# Patient Record
Sex: FEMALE | Race: WHITE | NOT HISPANIC OR LATINO | Employment: OTHER | URBAN - METROPOLITAN AREA
[De-identification: names, ages, dates, MRNs, and addresses within clinical notes are randomized per-mention and may not be internally consistent; named-entity substitution may affect disease eponyms.]

---

## 2017-04-10 ENCOUNTER — HISTORICAL (OUTPATIENT)
Dept: ADMINISTRATIVE | Facility: HOSPITAL | Age: 82
End: 2017-04-10

## 2017-04-13 ENCOUNTER — HISTORICAL (OUTPATIENT)
Dept: ADMINISTRATIVE | Facility: HOSPITAL | Age: 82
End: 2017-04-13

## 2017-04-17 ENCOUNTER — HISTORICAL (OUTPATIENT)
Dept: ADMINISTRATIVE | Facility: HOSPITAL | Age: 82
End: 2017-04-17

## 2017-04-26 ENCOUNTER — HISTORICAL (OUTPATIENT)
Dept: ADMINISTRATIVE | Facility: HOSPITAL | Age: 82
End: 2017-04-26

## 2017-06-06 ENCOUNTER — HISTORICAL (OUTPATIENT)
Dept: ADMINISTRATIVE | Facility: HOSPITAL | Age: 82
End: 2017-06-06

## 2017-06-06 LAB
ABS NEUT (OLG): 3.29 X10(3)/MCL (ref 2.1–9.2)
ALBUMIN SERPL-MCNC: 3.5 GM/DL (ref 3.4–5)
ALBUMIN/GLOB SERPL: 1.1 {RATIO}
ALP SERPL-CCNC: 50 UNIT/L (ref 38–126)
ALT SERPL-CCNC: 25 UNIT/L (ref 12–78)
AST SERPL-CCNC: 14 UNIT/L (ref 15–37)
BASOPHILS # BLD AUTO: 0.2 X10(3)/MCL (ref 0–0.2)
BASOPHILS NFR BLD AUTO: 2 %
BILIRUB SERPL-MCNC: 0.6 MG/DL (ref 0.2–1)
BILIRUBIN DIRECT+TOT PNL SERPL-MCNC: 0.2 MG/DL (ref 0–0.2)
BILIRUBIN DIRECT+TOT PNL SERPL-MCNC: 0.4 MG/DL (ref 0–0.8)
BUN SERPL-MCNC: 19 MG/DL (ref 7–18)
CALCIUM SERPL-MCNC: 8.9 MG/DL (ref 8.5–10.1)
CHLORIDE SERPL-SCNC: 106 MMOL/L (ref 98–107)
CHOLEST SERPL-MCNC: 133 MG/DL (ref 0–200)
CHOLEST/HDLC SERPL: 3.2 {RATIO} (ref 0–4)
CO2 SERPL-SCNC: 28 MMOL/L (ref 21–32)
CREAT SERPL-MCNC: 0.77 MG/DL (ref 0.55–1.02)
EOSINOPHIL # BLD AUTO: 0.3 X10(3)/MCL (ref 0–0.9)
EOSINOPHIL NFR BLD AUTO: 5 %
ERYTHROCYTE [DISTWIDTH] IN BLOOD BY AUTOMATED COUNT: 13.7 % (ref 11.5–17)
GLOBULIN SER-MCNC: 3.3 GM/DL (ref 2.4–3.5)
GLUCOSE SERPL-MCNC: 87 MG/DL (ref 74–106)
HCT VFR BLD AUTO: 42.1 % (ref 37–47)
HDLC SERPL-MCNC: 42 MG/DL (ref 35–60)
HGB BLD-MCNC: 14.2 GM/DL (ref 12–16)
LDLC SERPL CALC-MCNC: 66 MG/DL (ref 0–129)
LYMPHOCYTES # BLD AUTO: 2.1 X10(3)/MCL (ref 0.6–4.6)
LYMPHOCYTES NFR BLD AUTO: 31 %
MCH RBC QN AUTO: 31.6 PG (ref 27–31)
MCHC RBC AUTO-ENTMCNC: 33.7 GM/DL (ref 33–36)
MCV RBC AUTO: 93.8 FL (ref 80–94)
MONOCYTES # BLD AUTO: 0.8 X10(3)/MCL (ref 0.1–1.3)
MONOCYTES NFR BLD AUTO: 12 %
NEUTROPHILS # BLD AUTO: 3.29 X10(3)/MCL (ref 2.1–9.2)
NEUTROPHILS NFR BLD AUTO: 49 %
PLATELET # BLD AUTO: 267 X10(3)/MCL (ref 130–400)
PMV BLD AUTO: 10 FL (ref 9.4–12.4)
POTASSIUM SERPL-SCNC: 4.4 MMOL/L (ref 3.5–5.1)
PROT SERPL-MCNC: 6.8 GM/DL (ref 6.4–8.2)
RBC # BLD AUTO: 4.49 X10(6)/MCL (ref 4.2–5.4)
SODIUM SERPL-SCNC: 140 MMOL/L (ref 136–145)
TRIGL SERPL-MCNC: 125 MG/DL (ref 30–150)
VLDLC SERPL CALC-MCNC: 25 MG/DL
WBC # SPEC AUTO: 6.7 X10(3)/MCL (ref 4.5–11.5)

## 2017-06-22 ENCOUNTER — HISTORICAL (OUTPATIENT)
Dept: LAB | Facility: HOSPITAL | Age: 82
End: 2017-06-22

## 2017-06-22 LAB
ABS NEUT (OLG): 4.6 X10(3)/MCL (ref 2.1–9.2)
ALBUMIN SERPL-MCNC: 3.7 GM/DL (ref 3.4–5)
ALBUMIN/GLOB SERPL: 1.1 RATIO (ref 1.1–2)
ALP SERPL-CCNC: 53 UNIT/L (ref 38–126)
ALT SERPL-CCNC: 27 UNIT/L (ref 12–78)
APPEARANCE, UA: CLEAR
APTT PPP: 26.9 SECOND(S) (ref 20.6–36)
AST SERPL-CCNC: 15 UNIT/L (ref 15–37)
BACTERIA SPEC CULT: ABNORMAL /HPF
BASOPHILS # BLD AUTO: 0.2 X10(3)/MCL (ref 0–0.2)
BASOPHILS NFR BLD AUTO: 2 %
BILIRUB SERPL-MCNC: 0.7 MG/DL (ref 0.2–1)
BILIRUB UR QL STRIP: NEGATIVE
BILIRUBIN DIRECT+TOT PNL SERPL-MCNC: 0 MG/DL (ref 0–0.8)
BILIRUBIN DIRECT+TOT PNL SERPL-MCNC: 0.7 MG/DL (ref 0–0.5)
BUN SERPL-MCNC: 15 MG/DL (ref 7–18)
CALCIUM SERPL-MCNC: 9 MG/DL (ref 8.5–10.1)
CHLORIDE SERPL-SCNC: 105 MMOL/L (ref 98–107)
CO2 SERPL-SCNC: 25 MMOL/L (ref 21–32)
COLOR UR: YELLOW
CREAT SERPL-MCNC: 0.8 MG/DL (ref 0.55–1.02)
EOSINOPHIL # BLD AUTO: 0.2 X10(3)/MCL (ref 0–0.9)
EOSINOPHIL NFR BLD AUTO: 3 %
ERYTHROCYTE [DISTWIDTH] IN BLOOD BY AUTOMATED COUNT: 13.5 % (ref 11.5–17)
GLOBULIN SER-MCNC: 3.4 GM/DL (ref 2.4–3.5)
GLUCOSE (UA): NEGATIVE
GLUCOSE SERPL-MCNC: 92 MG/DL (ref 74–106)
HCT VFR BLD AUTO: 42.7 % (ref 37–47)
HGB BLD-MCNC: 14.4 GM/DL (ref 12–16)
HGB UR QL STRIP: ABNORMAL
INR PPP: 1.09 (ref 0–1.27)
KETONES UR QL STRIP: NEGATIVE
LEUKOCYTE ESTERASE UR QL STRIP: ABNORMAL
LYMPHOCYTES # BLD AUTO: 1.8 X10(3)/MCL (ref 0.6–4.6)
LYMPHOCYTES NFR BLD AUTO: 23 %
MCH RBC QN AUTO: 31.4 PG (ref 27–31)
MCHC RBC AUTO-ENTMCNC: 33.7 GM/DL (ref 33–36)
MCV RBC AUTO: 93.2 FL (ref 80–94)
MONOCYTES # BLD AUTO: 0.8 X10(3)/MCL (ref 0.1–1.3)
MONOCYTES NFR BLD AUTO: 10 %
NEUTROPHILS # BLD AUTO: 4.6 X10(3)/MCL (ref 2.1–9.2)
NEUTROPHILS NFR BLD AUTO: 61 %
NITRITE UR QL STRIP: NEGATIVE
PH UR STRIP: 5 [PH] (ref 5–9)
PLATELET # BLD AUTO: 291 X10(3)/MCL (ref 130–400)
PMV BLD AUTO: 9.9 FL (ref 9.4–12.4)
POTASSIUM SERPL-SCNC: 4.3 MMOL/L (ref 3.5–5.1)
PROT SERPL-MCNC: 7.1 GM/DL (ref 6.4–8.2)
PROT UR QL STRIP: NEGATIVE
PROTHROMBIN TIME: 13.9 SECOND(S) (ref 12.1–14.2)
RBC # BLD AUTO: 4.58 X10(6)/MCL (ref 4.2–5.4)
RBC #/AREA URNS HPF: ABNORMAL /[HPF]
SODIUM SERPL-SCNC: 137 MMOL/L (ref 136–145)
SP GR UR STRIP: 1.02 (ref 1–1.03)
SQUAMOUS EPITHELIAL, UA: ABNORMAL
TRANSFERRIN SERPL-MCNC: 217 MG/DL (ref 200–360)
UROBILINOGEN UR STRIP-ACNC: 0.2
WBC # SPEC AUTO: 7.5 X10(3)/MCL (ref 4.5–11.5)
WBC #/AREA URNS HPF: 15 /HPF (ref 0–3)

## 2017-06-24 LAB — FINAL CULTURE: NO GROWTH

## 2017-07-13 ENCOUNTER — HISTORICAL (OUTPATIENT)
Dept: ADMINISTRATIVE | Facility: HOSPITAL | Age: 82
End: 2017-07-13

## 2017-07-13 LAB
ABS NEUT (OLG): 6.1 X10(3)/MCL
ALBUMIN SERPL-MCNC: 2.5 GM/DL (ref 3.4–5)
ALBUMIN/GLOB SERPL: 1 {RATIO}
ALP SERPL-CCNC: 45 UNIT/L (ref 45–117)
ALT SERPL-CCNC: 19 UNIT/L (ref 13–56)
AST SERPL-CCNC: 25 UNIT/L (ref 15–37)
BASOPHILS # BLD AUTO: 0.1 X10(3)/MCL
BASOPHILS NFR BLD AUTO: 0.7 % (ref 0–1)
BILIRUB SERPL-MCNC: 0.5 MG/DL (ref 0.2–1)
BILIRUBIN DIRECT+TOT PNL SERPL-MCNC: 0.1 MG/DL (ref 0–0.2)
BILIRUBIN DIRECT+TOT PNL SERPL-MCNC: 0.4 MG/DL (ref 0–1)
BUN SERPL-MCNC: 19 MG/DL (ref 7–18)
CALCIUM SERPL-MCNC: 8.2 MG/DL (ref 8.5–10.1)
CHLORIDE SERPL-SCNC: 109 MMOL/L (ref 98–107)
CHOLEST SERPL-MCNC: 102 MG/DL (ref 0–200)
CHOLEST/HDLC SERPL: 2.2 {RATIO} (ref 0–4)
CO2 SERPL-SCNC: 27 MMOL/L (ref 21–32)
CREAT SERPL-MCNC: 0.86 MG/DL (ref 0.55–1.02)
EOSINOPHIL # BLD AUTO: 0.2 X10(3)/MCL
EOSINOPHIL NFR BLD AUTO: 2.8 % (ref 0–6.4)
ERYTHROCYTE [DISTWIDTH] IN BLOOD BY AUTOMATED COUNT: 14.1 % (ref 11.5–14.8)
GLOBULIN SER-MCNC: 4 GM/DL (ref 2–4)
GLUCOSE SERPL-MCNC: 95 MG/DL (ref 74–106)
HCT VFR BLD AUTO: 32.7 % (ref 35.5–44.6)
HDLC SERPL-MCNC: 47 MG/DL (ref 35–60)
HGB BLD-MCNC: 11.1 GM/DL (ref 12.1–15.4)
LDLC SERPL CALC-MCNC: 37 MG/DL (ref 0–129)
LYMPHOCYTES # BLD AUTO: 1.4 X10(3)/MCL
LYMPHOCYTES NFR BLD AUTO: 16.2 % (ref 16–44)
MCH RBC QN AUTO: 32.3 PG (ref 28.5–33.8)
MCHC RBC AUTO-ENTMCNC: 33.9 % (ref 33–37)
MCV RBC AUTO: 95.1 FL (ref 82–99)
MONOCYTES # BLD AUTO: 1 X10(3)/MCL
MONOCYTES NFR BLD AUTO: 11.2 % (ref 4–12.1)
NEUTROPHILS # BLD AUTO: 6.1 X10(3)/MCL
NEUTROPHILS NFR BLD AUTO: 68.9 % (ref 43–73)
PLATELET # BLD AUTO: 247 X10(3)/MCL (ref 136–369)
PMV BLD AUTO: 10.4 FL (ref 7.4–10.4)
POTASSIUM SERPL-SCNC: 5.2 MMOL/L (ref 3.5–5.1)
PREALB SERPL-MCNC: 11.4 MG/DL (ref 20–40)
PROT SERPL-MCNC: 6.4 GM/DL (ref 6.4–8.2)
RBC # BLD AUTO: 3.44 X10(6)/MCL (ref 4–5.5)
SODIUM SERPL-SCNC: 140 MMOL/L (ref 136–145)
TRIGL SERPL-MCNC: 89 MG/DL (ref 30–150)
VLDLC SERPL CALC-MCNC: 18 MG/DL
WBC # SPEC AUTO: 8.8 X10(3)/MCL (ref 4–10)

## 2017-07-14 LAB
ALBUMIN SERPL-MCNC: 2.5 GM/DL (ref 3.4–5)
ALBUMIN/GLOB SERPL: 0.7 {RATIO}
ALP SERPL-CCNC: 46 UNIT/L (ref 38–126)
ALT SERPL-CCNC: 22 UNIT/L (ref 12–78)
AST SERPL-CCNC: 27 UNIT/L (ref 15–37)
BILIRUB SERPL-MCNC: 0.6 MG/DL (ref 0.2–1)
BILIRUBIN DIRECT+TOT PNL SERPL-MCNC: 0.2 MG/DL (ref 0–0.2)
BILIRUBIN DIRECT+TOT PNL SERPL-MCNC: 0.4 MG/DL (ref 0–0.8)
BUN SERPL-MCNC: 14 MG/DL (ref 7–18)
CALCIUM SERPL-MCNC: 8.1 MG/DL (ref 8.5–10.1)
CHLORIDE SERPL-SCNC: 108 MMOL/L (ref 98–107)
CO2 SERPL-SCNC: 26 MMOL/L (ref 21–32)
CREAT SERPL-MCNC: 0.6 MG/DL (ref 0.55–1.02)
GLOBULIN SER-MCNC: 3.7 GM/DL (ref 2.4–3.5)
GLUCOSE SERPL-MCNC: 89 MG/DL (ref 74–106)
POTASSIUM SERPL-SCNC: 4.3 MMOL/L (ref 3.5–5.1)
PROT SERPL-MCNC: 6.2 GM/DL (ref 6.4–8.2)
SODIUM SERPL-SCNC: 140 MMOL/L (ref 136–145)

## 2017-07-17 LAB
ABS NEUT (OLG): 4.43 X10(3)/MCL (ref 2.1–9.2)
ALBUMIN SERPL-MCNC: 2.4 GM/DL (ref 3.4–5)
BASOPHILS # BLD AUTO: 0.2 X10(3)/MCL (ref 0–0.2)
BASOPHILS NFR BLD AUTO: 2 %
BUN SERPL-MCNC: 9 MG/DL (ref 7–18)
CALCIUM SERPL-MCNC: 8.2 MG/DL (ref 8.5–10.1)
CHLORIDE SERPL-SCNC: 106 MMOL/L (ref 98–107)
CO2 SERPL-SCNC: 26 MMOL/L (ref 21–32)
CREAT SERPL-MCNC: 0.63 MG/DL (ref 0.55–1.02)
EOSINOPHIL # BLD AUTO: 0.4 X10(3)/MCL (ref 0–0.9)
EOSINOPHIL NFR BLD AUTO: 5 %
ERYTHROCYTE [DISTWIDTH] IN BLOOD BY AUTOMATED COUNT: 13.7 % (ref 11.5–17)
GLUCOSE SERPL-MCNC: 89 MG/DL (ref 74–106)
HCT VFR BLD AUTO: 27.4 % (ref 37–47)
HGB BLD-MCNC: 9.2 GM/DL (ref 12–16)
LYMPHOCYTES # BLD AUTO: 2.1 X10(3)/MCL (ref 0.6–4.6)
LYMPHOCYTES NFR BLD AUTO: 25 %
MCH RBC QN AUTO: 31.7 PG (ref 27–31)
MCHC RBC AUTO-ENTMCNC: 33.6 GM/DL (ref 33–36)
MCV RBC AUTO: 94.5 FL (ref 80–94)
MONOCYTES # BLD AUTO: 1 X10(3)/MCL (ref 0.1–1.3)
MONOCYTES NFR BLD AUTO: 13 %
NEUTROPHILS # BLD AUTO: 4.43 X10(3)/MCL (ref 2.1–9.2)
NEUTROPHILS NFR BLD AUTO: 54 %
PLATELET # BLD AUTO: 298 X10(3)/MCL (ref 130–400)
PMV BLD AUTO: 9.9 FL (ref 9.4–12.4)
POTASSIUM SERPL-SCNC: 4.2 MMOL/L (ref 3.5–5.1)
PROT SERPL-MCNC: 5.8 GM/DL (ref 6.4–8.2)
RBC # BLD AUTO: 2.9 X10(6)/MCL (ref 4.2–5.4)
SODIUM SERPL-SCNC: 141 MMOL/L (ref 136–145)
WBC # SPEC AUTO: 8.3 X10(3)/MCL (ref 4.5–11.5)

## 2017-07-19 LAB
HCT VFR BLD AUTO: 29.3 % (ref 35.5–44.6)
HGB BLD-MCNC: 9.9 GM/DL (ref 12.1–15.4)

## 2017-07-20 LAB
ERYTHROCYTE [DISTWIDTH] IN BLOOD BY AUTOMATED COUNT: 14.1 % (ref 11.5–17)
HCT VFR BLD AUTO: 28.5 % (ref 37–47)
HGB BLD-MCNC: 9.5 GM/DL (ref 12–16)
MCH RBC QN AUTO: 31.7 PG (ref 27–31)
MCHC RBC AUTO-ENTMCNC: 33.3 GM/DL (ref 33–36)
MCV RBC AUTO: 95 FL (ref 80–94)
PLATELET # BLD AUTO: 374 X10(3)/MCL (ref 130–400)
PMV BLD AUTO: 9.6 FL (ref 9.4–12.4)
RBC # BLD AUTO: 3 X10(6)/MCL (ref 4.2–5.4)
WBC # SPEC AUTO: 10.1 X10(3)/MCL (ref 4.5–11.5)

## 2017-07-24 LAB
ABS NEUT (OLG): 5.53 X10(3)/MCL (ref 2.1–9.2)
ALBUMIN SERPL-MCNC: 2.6 GM/DL (ref 3.4–5)
BASOPHILS # BLD AUTO: 0.1 X10(3)/MCL (ref 0–0.2)
BASOPHILS NFR BLD AUTO: 2 %
BUN SERPL-MCNC: 10 MG/DL (ref 7–18)
CALCIUM SERPL-MCNC: 8.5 MG/DL (ref 8.5–10.1)
CHLORIDE SERPL-SCNC: 102 MMOL/L (ref 98–107)
CO2 SERPL-SCNC: 27 MMOL/L (ref 21–32)
CREAT SERPL-MCNC: 0.74 MG/DL (ref 0.55–1.02)
EOSINOPHIL # BLD AUTO: 0.4 X10(3)/MCL (ref 0–0.9)
EOSINOPHIL NFR BLD AUTO: 5 %
ERYTHROCYTE [DISTWIDTH] IN BLOOD BY AUTOMATED COUNT: 14.7 % (ref 11.5–17)
GLUCOSE SERPL-MCNC: 85 MG/DL (ref 74–106)
HCT VFR BLD AUTO: 28.6 % (ref 37–47)
HGB BLD-MCNC: 9.4 GM/DL (ref 12–16)
LYMPHOCYTES # BLD AUTO: 2.1 X10(3)/MCL (ref 0.6–4.6)
LYMPHOCYTES NFR BLD AUTO: 23 %
MCH RBC QN AUTO: 31.9 PG (ref 27–31)
MCHC RBC AUTO-ENTMCNC: 32.9 GM/DL (ref 33–36)
MCV RBC AUTO: 96.9 FL (ref 80–94)
MONOCYTES # BLD AUTO: 0.9 X10(3)/MCL (ref 0.1–1.3)
MONOCYTES NFR BLD AUTO: 10 %
NEUTROPHILS # BLD AUTO: 5.53 X10(3)/MCL (ref 2.1–9.2)
NEUTROPHILS NFR BLD AUTO: 60 %
PLATELET # BLD AUTO: 408 X10(3)/MCL (ref 130–400)
PMV BLD AUTO: 9.4 FL (ref 9.4–12.4)
POTASSIUM SERPL-SCNC: 4.4 MMOL/L (ref 3.5–5.1)
PROT SERPL-MCNC: 6.1 GM/DL (ref 6.4–8.2)
RBC # BLD AUTO: 2.95 X10(6)/MCL (ref 4.2–5.4)
SODIUM SERPL-SCNC: 136 MMOL/L (ref 136–145)
WBC # SPEC AUTO: 9.2 X10(3)/MCL (ref 4.5–11.5)

## 2017-08-01 ENCOUNTER — HISTORICAL (OUTPATIENT)
Dept: ADMINISTRATIVE | Facility: HOSPITAL | Age: 82
End: 2017-08-01

## 2017-08-02 LAB
BUN SERPL-MCNC: 11 MG/DL (ref 7–18)
CALCIUM SERPL-MCNC: 8.7 MG/DL (ref 9–10.9)
CHLORIDE SERPL-SCNC: 100 MMOL/L (ref 98–116)
CO2 SERPL-SCNC: 25 MMOL/L (ref 15–28)
CREAT SERPL-MCNC: 0.71 MG/DL (ref 0.6–1.3)
GLUCOSE SERPL-MCNC: 91 MG/DL (ref 74–106)
POTASSIUM SERPL-SCNC: 4.4 MMOL/L (ref 3.5–5.1)
SODIUM SERPL-SCNC: 132 MEQ/L (ref 131–145)

## 2017-12-11 ENCOUNTER — HISTORICAL (OUTPATIENT)
Dept: ADMINISTRATIVE | Facility: HOSPITAL | Age: 82
End: 2017-12-11

## 2017-12-11 LAB
ABS NEUT (OLG): 3.58 X10(3)/MCL (ref 2.1–9.2)
ALBUMIN SERPL-MCNC: 3.3 GM/DL (ref 3.4–5)
ALBUMIN/GLOB SERPL: 0.9 {RATIO}
ALP SERPL-CCNC: 55 UNIT/L (ref 38–126)
ALT SERPL-CCNC: 25 UNIT/L (ref 12–78)
AST SERPL-CCNC: 17 UNIT/L (ref 15–37)
BASOPHILS # BLD AUTO: 0.1 X10(3)/MCL (ref 0–0.2)
BASOPHILS NFR BLD AUTO: 2 %
BILIRUB SERPL-MCNC: 0.6 MG/DL (ref 0.2–1)
BILIRUBIN DIRECT+TOT PNL SERPL-MCNC: 0.2 MG/DL (ref 0–0.2)
BILIRUBIN DIRECT+TOT PNL SERPL-MCNC: 0.4 MG/DL (ref 0–0.8)
BUN SERPL-MCNC: 13 MG/DL (ref 7–18)
CALCIUM SERPL-MCNC: 8.6 MG/DL (ref 8.5–10.1)
CHLORIDE SERPL-SCNC: 106 MMOL/L (ref 98–107)
CHOLEST SERPL-MCNC: 125 MG/DL (ref 0–200)
CHOLEST/HDLC SERPL: 3.3 {RATIO} (ref 0–4)
CO2 SERPL-SCNC: 26 MMOL/L (ref 21–32)
CREAT SERPL-MCNC: 0.66 MG/DL (ref 0.55–1.02)
EOSINOPHIL # BLD AUTO: 0.4 X10(3)/MCL (ref 0–0.9)
EOSINOPHIL NFR BLD AUTO: 6 %
ERYTHROCYTE [DISTWIDTH] IN BLOOD BY AUTOMATED COUNT: 14.2 % (ref 11.5–17)
GLOBULIN SER-MCNC: 3.5 GM/DL (ref 2.4–3.5)
GLUCOSE SERPL-MCNC: 81 MG/DL (ref 74–106)
HCT VFR BLD AUTO: 42.3 % (ref 37–47)
HDLC SERPL-MCNC: 38 MG/DL (ref 35–60)
HGB BLD-MCNC: 13.8 GM/DL (ref 12–16)
LDLC SERPL CALC-MCNC: 60 MG/DL (ref 0–129)
LYMPHOCYTES # BLD AUTO: 1.6 X10(3)/MCL (ref 0.6–4.6)
LYMPHOCYTES NFR BLD AUTO: 25 %
MCH RBC QN AUTO: 30.1 PG (ref 27–31)
MCHC RBC AUTO-ENTMCNC: 32.6 GM/DL (ref 33–36)
MCV RBC AUTO: 92.2 FL (ref 80–94)
MONOCYTES # BLD AUTO: 0.6 X10(3)/MCL (ref 0.1–1.3)
MONOCYTES NFR BLD AUTO: 10 %
NEUTROPHILS # BLD AUTO: 3.58 X10(3)/MCL (ref 1.4–7.9)
NEUTROPHILS NFR BLD AUTO: 57 %
PLATELET # BLD AUTO: 253 X10(3)/MCL (ref 130–400)
PMV BLD AUTO: 10.1 FL (ref 9.4–12.4)
POTASSIUM SERPL-SCNC: 4.5 MMOL/L (ref 3.5–5.1)
PROT SERPL-MCNC: 6.8 GM/DL (ref 6.4–8.2)
RBC # BLD AUTO: 4.59 X10(6)/MCL (ref 4.2–5.4)
SODIUM SERPL-SCNC: 140 MMOL/L (ref 136–145)
TRIGL SERPL-MCNC: 134 MG/DL (ref 30–150)
TSH SERPL-ACNC: 1.12 MIU/ML (ref 0.36–3.74)
VLDLC SERPL CALC-MCNC: 27 MG/DL
WBC # SPEC AUTO: 6.2 X10(3)/MCL (ref 4.5–11.5)

## 2018-03-15 ENCOUNTER — HISTORICAL (OUTPATIENT)
Dept: ADMINISTRATIVE | Facility: HOSPITAL | Age: 83
End: 2018-03-15

## 2018-06-12 ENCOUNTER — HISTORICAL (OUTPATIENT)
Dept: ADMINISTRATIVE | Facility: HOSPITAL | Age: 83
End: 2018-06-12

## 2018-06-12 LAB
ABS NEUT (OLG): 3.06 X10(3)/MCL (ref 2.1–9.2)
BASOPHILS # BLD AUTO: 0.2 X10(3)/MCL (ref 0–0.2)
BASOPHILS NFR BLD AUTO: 2 %
CHOLEST SERPL-MCNC: 128 MG/DL (ref 0–200)
CHOLEST/HDLC SERPL: 4.3 {RATIO} (ref 0–4)
EOSINOPHIL # BLD AUTO: 0.4 X10(3)/MCL (ref 0–0.9)
EOSINOPHIL NFR BLD AUTO: 6 %
ERYTHROCYTE [DISTWIDTH] IN BLOOD BY AUTOMATED COUNT: 14.9 % (ref 11.5–17)
HCT VFR BLD AUTO: 37.4 % (ref 37–47)
HDLC SERPL-MCNC: 30 MG/DL (ref 35–60)
HGB BLD-MCNC: 11.8 GM/DL (ref 12–16)
LDLC SERPL CALC-MCNC: 42 MG/DL (ref 0–129)
LYMPHOCYTES # BLD AUTO: 2.1 X10(3)/MCL (ref 0.6–4.6)
LYMPHOCYTES NFR BLD AUTO: 33 %
MCH RBC QN AUTO: 32.2 PG (ref 27–31)
MCHC RBC AUTO-ENTMCNC: 31.6 GM/DL (ref 33–36)
MCV RBC AUTO: 102.2 FL (ref 80–94)
MONOCYTES # BLD AUTO: 0.7 X10(3)/MCL (ref 0.1–1.3)
MONOCYTES NFR BLD AUTO: 10 %
NEUTROPHILS # BLD AUTO: 3.06 X10(3)/MCL (ref 2.1–9.2)
NEUTROPHILS NFR BLD AUTO: 48 %
PLATELET # BLD AUTO: 321 X10(3)/MCL (ref 130–400)
PMV BLD AUTO: 9.4 FL (ref 9.4–12.4)
RBC # BLD AUTO: 3.66 X10(6)/MCL (ref 4.2–5.4)
TRIGL SERPL-MCNC: 281 MG/DL (ref 30–150)
TSH SERPL-ACNC: 1.22 MIU/L (ref 0.36–3.74)
VLDLC SERPL CALC-MCNC: 56 MG/DL
WBC # SPEC AUTO: 6.4 X10(3)/MCL (ref 4.5–11.5)

## 2018-11-30 ENCOUNTER — HISTORICAL (OUTPATIENT)
Dept: ADMINISTRATIVE | Facility: HOSPITAL | Age: 83
End: 2018-11-30

## 2018-11-30 LAB
ABS NEUT (OLG): 2.19 X10(3)/MCL (ref 2.1–9.2)
ALBUMIN SERPL-MCNC: 3.4 GM/DL (ref 3.4–5)
ALBUMIN/GLOB SERPL: 1 {RATIO}
ALP SERPL-CCNC: 47 UNIT/L (ref 38–126)
ALT SERPL-CCNC: 25 UNIT/L (ref 12–78)
AST SERPL-CCNC: 16 UNIT/L (ref 15–37)
BILIRUB SERPL-MCNC: 0.6 MG/DL (ref 0.2–1)
BILIRUBIN DIRECT+TOT PNL SERPL-MCNC: 0.1 MG/DL (ref 0–0.2)
BILIRUBIN DIRECT+TOT PNL SERPL-MCNC: 0.5 MG/DL (ref 0–0.8)
BUN SERPL-MCNC: 11 MG/DL (ref 7–18)
CALCIUM SERPL-MCNC: 8.8 MG/DL (ref 8.5–10.1)
CHLORIDE SERPL-SCNC: 104 MMOL/L (ref 98–107)
CHOLEST SERPL-MCNC: 121 MG/DL (ref 0–200)
CHOLEST/HDLC SERPL: 3.4 {RATIO} (ref 0–4)
CO2 SERPL-SCNC: 26 MMOL/L (ref 21–32)
CREAT SERPL-MCNC: 0.78 MG/DL (ref 0.55–1.02)
EOSINOPHIL NFR BLD MANUAL: 3 % (ref 0–8)
ERYTHROCYTE [DISTWIDTH] IN BLOOD BY AUTOMATED COUNT: 13.6 % (ref 11.5–17)
GLOBULIN SER-MCNC: 3.5 GM/DL (ref 2.4–3.5)
GLUCOSE SERPL-MCNC: 93 MG/DL (ref 74–106)
HCT VFR BLD AUTO: 41.2 % (ref 37–47)
HDLC SERPL-MCNC: 36 MG/DL (ref 35–60)
HGB BLD-MCNC: 13.5 GM/DL (ref 12–16)
LDLC SERPL CALC-MCNC: 44 MG/DL (ref 0–129)
LYMPHOCYTES NFR BLD MANUAL: 27 % (ref 13–40)
MCH RBC QN AUTO: 31.9 PG (ref 27–31)
MCHC RBC AUTO-ENTMCNC: 32.8 GM/DL (ref 33–36)
MCV RBC AUTO: 97.4 FL (ref 80–94)
MONOCYTES NFR BLD MANUAL: 15 % (ref 2–11)
NEUTROPHILS NFR BLD MANUAL: 55 % (ref 47–80)
PLATELET # BLD AUTO: 263 X10(3)/MCL (ref 130–400)
PLATELET # BLD EST: NORMAL 10*3/UL
PMV BLD AUTO: 9.5 FL (ref 7.4–10.4)
POTASSIUM SERPL-SCNC: 4.6 MMOL/L (ref 3.5–5.1)
PROT SERPL-MCNC: 6.9 GM/DL (ref 6.4–8.2)
RBC # BLD AUTO: 4.23 X10(6)/MCL (ref 4.2–5.4)
SODIUM SERPL-SCNC: 139 MMOL/L (ref 136–145)
TRIGL SERPL-MCNC: 205 MG/DL (ref 30–150)
TSH SERPL-ACNC: 1.03 MIU/L (ref 0.36–3.74)
VLDLC SERPL CALC-MCNC: 41 MG/DL
WBC # SPEC AUTO: 4.9 X10(3)/MCL (ref 4.5–11.5)

## 2019-05-28 ENCOUNTER — HISTORICAL (OUTPATIENT)
Dept: ADMINISTRATIVE | Facility: HOSPITAL | Age: 84
End: 2019-05-28

## 2019-05-31 ENCOUNTER — HISTORICAL (OUTPATIENT)
Dept: ADMINISTRATIVE | Facility: HOSPITAL | Age: 84
End: 2019-05-31

## 2019-05-31 LAB
ABS NEUT (OLG): 2.65 X10(3)/MCL (ref 2.1–9.2)
ALBUMIN SERPL-MCNC: 3.6 GM/DL (ref 3.4–5)
ALBUMIN/GLOB SERPL: 1 {RATIO}
ALP SERPL-CCNC: 52 UNIT/L (ref 38–126)
ALT SERPL-CCNC: 23 UNIT/L (ref 12–78)
AST SERPL-CCNC: 15 UNIT/L (ref 15–37)
BASOPHILS # BLD AUTO: 0.1 X10(3)/MCL (ref 0–0.2)
BASOPHILS NFR BLD AUTO: 2 %
BILIRUB SERPL-MCNC: 0.5 MG/DL (ref 0.2–1)
BILIRUBIN DIRECT+TOT PNL SERPL-MCNC: 0.1 MG/DL (ref 0–0.2)
BILIRUBIN DIRECT+TOT PNL SERPL-MCNC: 0.4 MG/DL (ref 0–0.8)
BUN SERPL-MCNC: 10 MG/DL (ref 7–18)
CALCIUM SERPL-MCNC: 9.3 MG/DL (ref 8.5–10.1)
CHLORIDE SERPL-SCNC: 106 MMOL/L (ref 98–107)
CHOLEST SERPL-MCNC: 139 MG/DL (ref 0–200)
CHOLEST/HDLC SERPL: 3.9 {RATIO} (ref 0–4)
CO2 SERPL-SCNC: 26 MMOL/L (ref 21–32)
CREAT SERPL-MCNC: 0.8 MG/DL (ref 0.55–1.02)
EOSINOPHIL # BLD AUTO: 0.2 X10(3)/MCL (ref 0–0.9)
EOSINOPHIL NFR BLD AUTO: 4 %
ERYTHROCYTE [DISTWIDTH] IN BLOOD BY AUTOMATED COUNT: 13.1 % (ref 11.5–17)
GLOBULIN SER-MCNC: 3.6 GM/DL (ref 2.4–3.5)
GLUCOSE SERPL-MCNC: 99 MG/DL (ref 74–106)
HCT VFR BLD AUTO: 40.2 % (ref 37–47)
HDLC SERPL-MCNC: 36 MG/DL (ref 35–60)
HGB BLD-MCNC: 13.2 GM/DL (ref 12–16)
LDLC SERPL CALC-MCNC: 45 MG/DL (ref 0–129)
LYMPHOCYTES # BLD AUTO: 1.8 X10(3)/MCL (ref 0.6–4.6)
LYMPHOCYTES NFR BLD AUTO: 34 %
MCH RBC QN AUTO: 32 PG (ref 27–31)
MCHC RBC AUTO-ENTMCNC: 32.8 GM/DL (ref 33–36)
MCV RBC AUTO: 97.6 FL (ref 80–94)
MONOCYTES # BLD AUTO: 0.6 X10(3)/MCL (ref 0.1–1.3)
MONOCYTES NFR BLD AUTO: 11 %
NEUTROPHILS # BLD AUTO: 2.65 X10(3)/MCL (ref 2.1–9.2)
NEUTROPHILS NFR BLD AUTO: 48 %
PLATELET # BLD AUTO: 257 X10(3)/MCL (ref 130–400)
PMV BLD AUTO: 9.8 FL (ref 9.4–12.4)
POTASSIUM SERPL-SCNC: 4.3 MMOL/L (ref 3.5–5.1)
PROT SERPL-MCNC: 7.2 GM/DL (ref 6.4–8.2)
RBC # BLD AUTO: 4.12 X10(6)/MCL (ref 4.2–5.4)
SODIUM SERPL-SCNC: 139 MMOL/L (ref 136–145)
TRIGL SERPL-MCNC: 292 MG/DL (ref 30–150)
TSH SERPL-ACNC: 1.84 MIU/L (ref 0.36–3.74)
VLDLC SERPL CALC-MCNC: 58 MG/DL
WBC # SPEC AUTO: 5.5 X10(3)/MCL (ref 4.5–11.5)

## 2019-06-07 ENCOUNTER — HISTORICAL (OUTPATIENT)
Dept: ADMINISTRATIVE | Facility: HOSPITAL | Age: 84
End: 2019-06-07

## 2019-10-09 ENCOUNTER — HISTORICAL (OUTPATIENT)
Dept: LAB | Facility: HOSPITAL | Age: 84
End: 2019-10-09

## 2019-10-09 LAB
ABS NEUT (OLG): 3.47 X10(3)/MCL (ref 2.1–9.2)
ALBUMIN SERPL-MCNC: 3.5 GM/DL (ref 3.4–5)
ALBUMIN/GLOB SERPL: 1 RATIO (ref 1.1–2)
ALP SERPL-CCNC: 53 UNIT/L (ref 38–126)
ALT SERPL-CCNC: 21 UNIT/L (ref 12–78)
AST SERPL-CCNC: 14 UNIT/L (ref 15–37)
BASOPHILS # BLD AUTO: 0.2 X10(3)/MCL (ref 0–0.2)
BASOPHILS NFR BLD AUTO: 2 %
BILIRUB SERPL-MCNC: 0.4 MG/DL (ref 0.2–1)
BILIRUBIN DIRECT+TOT PNL SERPL-MCNC: 0.1 MG/DL (ref 0–0.5)
BILIRUBIN DIRECT+TOT PNL SERPL-MCNC: 0.3 MG/DL (ref 0–0.8)
BUN SERPL-MCNC: 10 MG/DL (ref 7–18)
CALCIUM SERPL-MCNC: 8.7 MG/DL (ref 8.5–10.1)
CHLORIDE SERPL-SCNC: 105 MMOL/L (ref 98–107)
CO2 SERPL-SCNC: 28 MMOL/L (ref 21–32)
CREAT SERPL-MCNC: 0.71 MG/DL (ref 0.55–1.02)
EOSINOPHIL # BLD AUTO: 0.2 X10(3)/MCL (ref 0–0.9)
EOSINOPHIL NFR BLD AUTO: 4 %
ERYTHROCYTE [DISTWIDTH] IN BLOOD BY AUTOMATED COUNT: 13.2 % (ref 11.5–17)
GLOBULIN SER-MCNC: 3.5 GM/DL (ref 2.4–3.5)
GLUCOSE SERPL-MCNC: 94 MG/DL (ref 74–106)
HCT VFR BLD AUTO: 38.7 % (ref 37–47)
HGB BLD-MCNC: 12.3 GM/DL (ref 12–16)
LYMPHOCYTES # BLD AUTO: 2.1 X10(3)/MCL (ref 0.6–4.6)
LYMPHOCYTES NFR BLD AUTO: 31 %
MCH RBC QN AUTO: 32 PG (ref 27–31)
MCHC RBC AUTO-ENTMCNC: 31.8 GM/DL (ref 33–36)
MCV RBC AUTO: 100.8 FL (ref 80–94)
MONOCYTES # BLD AUTO: 0.8 X10(3)/MCL (ref 0.1–1.3)
MONOCYTES NFR BLD AUTO: 12 %
NEUTROPHILS # BLD AUTO: 3.47 X10(3)/MCL (ref 2.1–9.2)
NEUTROPHILS NFR BLD AUTO: 51 %
PLATELET # BLD AUTO: 294 X10(3)/MCL (ref 130–400)
PMV BLD AUTO: 10.3 FL (ref 9.4–12.4)
POTASSIUM SERPL-SCNC: 4.9 MMOL/L (ref 3.5–5.1)
PROT SERPL-MCNC: 7 GM/DL (ref 6.4–8.2)
RBC # BLD AUTO: 3.84 X10(6)/MCL (ref 4.2–5.4)
SODIUM SERPL-SCNC: 140 MMOL/L (ref 136–145)
WBC # SPEC AUTO: 6.8 X10(3)/MCL (ref 4.5–11.5)

## 2019-12-10 ENCOUNTER — HISTORICAL (OUTPATIENT)
Dept: ADMINISTRATIVE | Facility: HOSPITAL | Age: 84
End: 2019-12-10

## 2019-12-10 LAB
ABS NEUT (OLG): 3.02 X10(3)/MCL (ref 2.1–9.2)
ALBUMIN SERPL-MCNC: 3.8 GM/DL (ref 3.4–5)
ALBUMIN/GLOB SERPL: 1.1 {RATIO}
ALP SERPL-CCNC: 52 UNIT/L (ref 38–126)
ALT SERPL-CCNC: 24 UNIT/L (ref 12–78)
AST SERPL-CCNC: 12 UNIT/L (ref 15–37)
BASOPHILS # BLD AUTO: 0.1 X10(3)/MCL (ref 0–0.2)
BASOPHILS NFR BLD AUTO: 2 %
BILIRUB SERPL-MCNC: 0.4 MG/DL (ref 0.2–1)
BILIRUBIN DIRECT+TOT PNL SERPL-MCNC: 0.1 MG/DL (ref 0–0.2)
BILIRUBIN DIRECT+TOT PNL SERPL-MCNC: 0.3 MG/DL (ref 0–0.8)
BUN SERPL-MCNC: 12 MG/DL (ref 7–18)
CALCIUM SERPL-MCNC: 9.3 MG/DL (ref 8.5–10.1)
CHLORIDE SERPL-SCNC: 106 MMOL/L (ref 98–107)
CHOLEST SERPL-MCNC: 133 MG/DL (ref 0–200)
CHOLEST/HDLC SERPL: 4.3 {RATIO} (ref 0–4)
CO2 SERPL-SCNC: 27 MMOL/L (ref 21–32)
CREAT SERPL-MCNC: 0.81 MG/DL (ref 0.55–1.02)
EOSINOPHIL # BLD AUTO: 0.2 X10(3)/MCL (ref 0–0.9)
EOSINOPHIL NFR BLD AUTO: 3 %
ERYTHROCYTE [DISTWIDTH] IN BLOOD BY AUTOMATED COUNT: 13 % (ref 11.5–17)
GLOBULIN SER-MCNC: 3.5 GM/DL (ref 2.4–3.5)
GLUCOSE SERPL-MCNC: 98 MG/DL (ref 74–106)
HCT VFR BLD AUTO: 42.8 % (ref 37–47)
HDLC SERPL-MCNC: 31 MG/DL (ref 35–60)
HGB BLD-MCNC: 13.7 GM/DL (ref 12–16)
LDLC SERPL CALC-MCNC: 44 MG/DL (ref 0–129)
LYMPHOCYTES # BLD AUTO: 1.7 X10(3)/MCL (ref 0.6–4.6)
LYMPHOCYTES NFR BLD AUTO: 30 %
MCH RBC QN AUTO: 31.5 PG (ref 27–31)
MCHC RBC AUTO-ENTMCNC: 32 GM/DL (ref 33–36)
MCV RBC AUTO: 98.4 FL (ref 80–94)
MONOCYTES # BLD AUTO: 0.6 X10(3)/MCL (ref 0.1–1.3)
MONOCYTES NFR BLD AUTO: 11 %
NEUTROPHILS # BLD AUTO: 3.02 X10(3)/MCL (ref 2.1–9.2)
NEUTROPHILS NFR BLD AUTO: 53 %
PLATELET # BLD AUTO: 299 X10(3)/MCL (ref 130–400)
PMV BLD AUTO: 10 FL (ref 9.4–12.4)
POTASSIUM SERPL-SCNC: 4.6 MMOL/L (ref 3.5–5.1)
PROT SERPL-MCNC: 7.3 GM/DL (ref 6.4–8.2)
RBC # BLD AUTO: 4.35 X10(6)/MCL (ref 4.2–5.4)
SODIUM SERPL-SCNC: 140 MMOL/L (ref 136–145)
TRIGL SERPL-MCNC: 289 MG/DL (ref 30–150)
VLDLC SERPL CALC-MCNC: 58 MG/DL
WBC # SPEC AUTO: 5.7 X10(3)/MCL (ref 4.5–11.5)

## 2020-06-17 ENCOUNTER — HISTORICAL (OUTPATIENT)
Dept: ADMINISTRATIVE | Facility: HOSPITAL | Age: 85
End: 2020-06-17

## 2020-06-17 LAB
ABS NEUT (OLG): 3.46 X10(3)/MCL (ref 2.1–9.2)
ALBUMIN SERPL-MCNC: 3.8 GM/DL (ref 3.4–4.8)
ALBUMIN/GLOB SERPL: 1.1 RATIO (ref 1.1–2)
ALP SERPL-CCNC: 49 UNIT/L (ref 40–150)
ALT SERPL-CCNC: 18 UNIT/L (ref 0–55)
AST SERPL-CCNC: 19 UNIT/L (ref 5–34)
BASOPHILS # BLD AUTO: 0.2 X10(3)/MCL (ref 0–0.2)
BASOPHILS NFR BLD AUTO: 2 %
BILIRUB SERPL-MCNC: 0.6 MG/DL
BILIRUBIN DIRECT+TOT PNL SERPL-MCNC: 0.2 MG/DL (ref 0–0.5)
BILIRUBIN DIRECT+TOT PNL SERPL-MCNC: 0.4 MG/DL (ref 0–0.8)
BUN SERPL-MCNC: 12.5 MG/DL (ref 9.8–20.1)
CALCIUM SERPL-MCNC: 9 MG/DL (ref 8.4–10.2)
CHLORIDE SERPL-SCNC: 105 MMOL/L (ref 98–107)
CHOLEST SERPL-MCNC: 148 MG/DL
CHOLEST/HDLC SERPL: 4 {RATIO} (ref 0–5)
CO2 SERPL-SCNC: 28 MMOL/L (ref 23–31)
CREAT SERPL-MCNC: 0.74 MG/DL (ref 0.55–1.02)
EOSINOPHIL # BLD AUTO: 0.3 X10(3)/MCL (ref 0–0.9)
EOSINOPHIL NFR BLD AUTO: 4 %
ERYTHROCYTE [DISTWIDTH] IN BLOOD BY AUTOMATED COUNT: 13.6 % (ref 11.5–17)
GLOBULIN SER-MCNC: 3.6 GM/DL (ref 2.4–3.5)
GLUCOSE SERPL-MCNC: 101 MG/DL (ref 82–115)
HCT VFR BLD AUTO: 41.9 % (ref 37–47)
HDLC SERPL-MCNC: 33 MG/DL (ref 35–60)
HGB BLD-MCNC: 13.3 GM/DL (ref 12–16)
LDLC SERPL CALC-MCNC: 46 MG/DL (ref 50–140)
LYMPHOCYTES # BLD AUTO: 1.5 X10(3)/MCL (ref 0.6–4.6)
LYMPHOCYTES NFR BLD AUTO: 24 %
MCH RBC QN AUTO: 32 PG (ref 27–31)
MCHC RBC AUTO-ENTMCNC: 31.7 GM/DL (ref 33–36)
MCV RBC AUTO: 100.7 FL (ref 80–94)
MONOCYTES # BLD AUTO: 0.7 X10(3)/MCL (ref 0.1–1.3)
MONOCYTES NFR BLD AUTO: 12 %
NEUTROPHILS # BLD AUTO: 3.46 X10(3)/MCL (ref 2.1–9.2)
NEUTROPHILS NFR BLD AUTO: 57 %
PLATELET # BLD AUTO: 290 X10(3)/MCL (ref 130–400)
PMV BLD AUTO: 9.6 FL (ref 9.4–12.4)
POTASSIUM SERPL-SCNC: 5.1 MMOL/L (ref 3.5–5.1)
PROT SERPL-MCNC: 7.4 GM/DL (ref 5.8–7.6)
RBC # BLD AUTO: 4.16 X10(6)/MCL (ref 4.2–5.4)
SODIUM SERPL-SCNC: 141 MMOL/L (ref 136–145)
TRIGL SERPL-MCNC: 347 MG/DL (ref 37–140)
VLDLC SERPL CALC-MCNC: 69 MG/DL
WBC # SPEC AUTO: 6.1 X10(3)/MCL (ref 4.5–11.5)

## 2020-12-17 ENCOUNTER — HISTORICAL (OUTPATIENT)
Dept: ADMINISTRATIVE | Facility: HOSPITAL | Age: 85
End: 2020-12-17

## 2020-12-17 LAB
ABS NEUT (OLG): 4.98 X10(3)/MCL (ref 2.1–9.2)
ALBUMIN SERPL-MCNC: 3.9 GM/DL (ref 3.4–4.8)
ALBUMIN/GLOB SERPL: 1.1 RATIO (ref 1.1–2)
ALP SERPL-CCNC: 54 UNIT/L (ref 40–150)
ALT SERPL-CCNC: 20 UNIT/L (ref 0–55)
AST SERPL-CCNC: 20 UNIT/L (ref 5–34)
BASOPHILS # BLD AUTO: 0.2 X10(3)/MCL (ref 0–0.2)
BASOPHILS NFR BLD AUTO: 2 %
BILIRUB SERPL-MCNC: 0.6 MG/DL
BILIRUBIN DIRECT+TOT PNL SERPL-MCNC: 0.2 MG/DL (ref 0–0.5)
BILIRUBIN DIRECT+TOT PNL SERPL-MCNC: 0.4 MG/DL (ref 0–0.8)
BUN SERPL-MCNC: 15 MG/DL (ref 9.8–20.1)
CALCIUM SERPL-MCNC: 9 MG/DL (ref 8.4–10.2)
CHLORIDE SERPL-SCNC: 102 MMOL/L (ref 98–107)
CHOLEST SERPL-MCNC: 124 MG/DL
CHOLEST/HDLC SERPL: 4 {RATIO} (ref 0–5)
CO2 SERPL-SCNC: 30 MMOL/L (ref 23–31)
CREAT SERPL-MCNC: 0.87 MG/DL (ref 0.55–1.02)
EOSINOPHIL # BLD AUTO: 0.2 X10(3)/MCL (ref 0–0.9)
EOSINOPHIL NFR BLD AUTO: 2 %
ERYTHROCYTE [DISTWIDTH] IN BLOOD BY AUTOMATED COUNT: 13.3 % (ref 11.5–17)
GLOBULIN SER-MCNC: 3.4 GM/DL (ref 2.4–3.5)
GLUCOSE SERPL-MCNC: 95 MG/DL (ref 82–115)
HCT VFR BLD AUTO: 42.5 % (ref 37–47)
HDLC SERPL-MCNC: 33 MG/DL (ref 35–60)
HGB BLD-MCNC: 13.7 GM/DL (ref 12–16)
LDLC SERPL CALC-MCNC: 58 MG/DL (ref 50–140)
LYMPHOCYTES # BLD AUTO: 1.5 X10(3)/MCL (ref 0.6–4.6)
LYMPHOCYTES NFR BLD AUTO: 20 %
MCH RBC QN AUTO: 32.4 PG (ref 27–31)
MCHC RBC AUTO-ENTMCNC: 32.2 GM/DL (ref 33–36)
MCV RBC AUTO: 100.5 FL (ref 80–94)
MONOCYTES # BLD AUTO: 0.8 X10(3)/MCL (ref 0.1–1.3)
MONOCYTES NFR BLD AUTO: 11 %
NEUTROPHILS # BLD AUTO: 4.98 X10(3)/MCL (ref 2.1–9.2)
NEUTROPHILS NFR BLD AUTO: 64 %
PLATELET # BLD AUTO: 336 X10(3)/MCL (ref 130–400)
PMV BLD AUTO: 9.7 FL (ref 9.4–12.4)
POTASSIUM SERPL-SCNC: 4.8 MMOL/L (ref 3.5–5.1)
PROT SERPL-MCNC: 7.3 GM/DL (ref 5.8–7.6)
RBC # BLD AUTO: 4.23 X10(6)/MCL (ref 4.2–5.4)
SODIUM SERPL-SCNC: 138 MMOL/L (ref 136–145)
TRIGL SERPL-MCNC: 166 MG/DL (ref 37–140)
TSH SERPL-ACNC: 1.24 UIU/ML (ref 0.35–4.94)
VLDLC SERPL CALC-MCNC: 33 MG/DL
WBC # SPEC AUTO: 7.7 X10(3)/MCL (ref 4.5–11.5)

## 2021-06-21 ENCOUNTER — HISTORICAL (OUTPATIENT)
Dept: ADMINISTRATIVE | Facility: HOSPITAL | Age: 86
End: 2021-06-21

## 2021-06-21 LAB
ABS NEUT (OLG): 2.88 X10(3)/MCL (ref 2.1–9.2)
ALBUMIN SERPL-MCNC: 3.6 GM/DL (ref 3.4–4.8)
ALBUMIN/GLOB SERPL: 1 RATIO (ref 1.1–2)
ALP SERPL-CCNC: 45 UNIT/L (ref 40–150)
ALT SERPL-CCNC: 17 UNIT/L (ref 0–55)
AST SERPL-CCNC: 18 UNIT/L (ref 5–34)
BASOPHILS # BLD AUTO: 0.2 X10(3)/MCL (ref 0–0.2)
BASOPHILS NFR BLD AUTO: 3 %
BILIRUB SERPL-MCNC: 0.4 MG/DL
BILIRUBIN DIRECT+TOT PNL SERPL-MCNC: 0.2 MG/DL (ref 0–0.5)
BILIRUBIN DIRECT+TOT PNL SERPL-MCNC: 0.2 MG/DL (ref 0–0.8)
BUN SERPL-MCNC: 13.5 MG/DL (ref 9.8–20.1)
CALCIUM SERPL-MCNC: 9.6 MG/DL (ref 8.4–10.2)
CHLORIDE SERPL-SCNC: 104 MMOL/L (ref 98–107)
CHOLEST SERPL-MCNC: 120 MG/DL
CHOLEST/HDLC SERPL: 4 {RATIO} (ref 0–5)
CO2 SERPL-SCNC: 26 MMOL/L (ref 23–31)
CREAT SERPL-MCNC: 0.8 MG/DL (ref 0.55–1.02)
EOSINOPHIL # BLD AUTO: 0.3 X10(3)/MCL (ref 0–0.9)
EOSINOPHIL NFR BLD AUTO: 5 %
ERYTHROCYTE [DISTWIDTH] IN BLOOD BY AUTOMATED COUNT: 13 % (ref 11.5–17)
GLOBULIN SER-MCNC: 3.6 GM/DL (ref 2.4–3.5)
GLUCOSE SERPL-MCNC: 95 MG/DL (ref 82–115)
HCT VFR BLD AUTO: 42.3 % (ref 37–47)
HDLC SERPL-MCNC: 32 MG/DL (ref 35–60)
HGB BLD-MCNC: 13.8 GM/DL (ref 12–16)
LDLC SERPL CALC-MCNC: 32 MG/DL (ref 50–140)
LYMPHOCYTES # BLD AUTO: 2.1 X10(3)/MCL (ref 0.6–4.6)
LYMPHOCYTES NFR BLD AUTO: 34 %
MCH RBC QN AUTO: 32.2 PG (ref 27–31)
MCHC RBC AUTO-ENTMCNC: 32.6 GM/DL (ref 33–36)
MCV RBC AUTO: 98.6 FL (ref 80–94)
MONOCYTES # BLD AUTO: 0.7 X10(3)/MCL (ref 0.1–1.3)
MONOCYTES NFR BLD AUTO: 12 %
NEUTROPHILS # BLD AUTO: 2.88 X10(3)/MCL (ref 2.1–9.2)
NEUTROPHILS NFR BLD AUTO: 46 %
PLATELET # BLD AUTO: 297 X10(3)/MCL (ref 130–400)
PMV BLD AUTO: 9.8 FL (ref 9.4–12.4)
POTASSIUM SERPL-SCNC: 4.6 MMOL/L (ref 3.5–5.1)
PROT SERPL-MCNC: 7.2 GM/DL (ref 5.8–7.6)
RBC # BLD AUTO: 4.29 X10(6)/MCL (ref 4.2–5.4)
SODIUM SERPL-SCNC: 138 MMOL/L (ref 136–145)
TRIGL SERPL-MCNC: 279 MG/DL (ref 37–140)
VLDLC SERPL CALC-MCNC: 56 MG/DL
WBC # SPEC AUTO: 6.2 X10(3)/MCL (ref 4.5–11.5)

## 2021-12-17 ENCOUNTER — HISTORICAL (OUTPATIENT)
Dept: ADMINISTRATIVE | Facility: HOSPITAL | Age: 86
End: 2021-12-17

## 2021-12-17 LAB
ABS NEUT (OLG): 3.93 X10(3)/MCL (ref 2.1–9.2)
ALBUMIN SERPL-MCNC: 3.9 GM/DL (ref 3.4–4.8)
ALBUMIN/GLOB SERPL: 1.1 RATIO (ref 1.1–2)
ALP SERPL-CCNC: 52 UNIT/L (ref 40–150)
ALT SERPL-CCNC: 23 UNIT/L (ref 0–55)
APPEARANCE, UA: CLEAR
AST SERPL-CCNC: 24 UNIT/L (ref 5–34)
BACTERIA SPEC CULT: ABNORMAL /HPF
BASOPHILS # BLD AUTO: 0.2 X10(3)/MCL (ref 0–0.2)
BASOPHILS NFR BLD AUTO: 2 %
BILIRUB SERPL-MCNC: 0.5 MG/DL
BILIRUB UR QL STRIP: NEGATIVE
BILIRUBIN DIRECT+TOT PNL SERPL-MCNC: 0.2 MG/DL (ref 0–0.5)
BILIRUBIN DIRECT+TOT PNL SERPL-MCNC: 0.3 MG/DL (ref 0–0.8)
BUN SERPL-MCNC: 16.5 MG/DL (ref 9.8–20.1)
CALCIUM SERPL-MCNC: 9.6 MG/DL (ref 8.7–10.5)
CHLORIDE SERPL-SCNC: 106 MMOL/L (ref 98–111)
CO2 SERPL-SCNC: 29 MMOL/L (ref 23–31)
COLOR UR: YELLOW
CREAT SERPL-MCNC: 0.81 MG/DL (ref 0.55–1.02)
EOSINOPHIL # BLD AUTO: 0.2 X10(3)/MCL (ref 0–0.9)
EOSINOPHIL NFR BLD AUTO: 3 %
ERYTHROCYTE [DISTWIDTH] IN BLOOD BY AUTOMATED COUNT: 13.3 % (ref 11.5–17)
FOLATE SERPL-MCNC: 15.5 NG/ML (ref 7–31.4)
GLOBULIN SER-MCNC: 3.7 GM/DL (ref 2.4–3.5)
GLUCOSE (UA): NEGATIVE
GLUCOSE SERPL-MCNC: 106 MG/DL (ref 75–121)
HCT VFR BLD AUTO: 42.6 % (ref 37–47)
HGB BLD-MCNC: 13.7 GM/DL (ref 12–16)
HGB UR QL STRIP: NEGATIVE
KETONES UR QL STRIP: NEGATIVE
LEUKOCYTE ESTERASE UR QL STRIP: ABNORMAL
LYMPHOCYTES # BLD AUTO: 1.8 X10(3)/MCL (ref 0.6–4.6)
LYMPHOCYTES NFR BLD AUTO: 26 %
MCH RBC QN AUTO: 31.6 PG (ref 27–31)
MCHC RBC AUTO-ENTMCNC: 32.2 GM/DL (ref 33–36)
MCV RBC AUTO: 98.4 FL (ref 80–94)
MONOCYTES # BLD AUTO: 0.8 X10(3)/MCL (ref 0.1–1.3)
MONOCYTES NFR BLD AUTO: 11 %
NEUTROPHILS # BLD AUTO: 3.93 X10(3)/MCL (ref 2.1–9.2)
NEUTROPHILS NFR BLD AUTO: 57 %
NITRITE UR QL STRIP: NEGATIVE
PH UR STRIP: 5 [PH] (ref 5–9)
PLATELET # BLD AUTO: 322 X10(3)/MCL (ref 130–400)
PMV BLD AUTO: 10.1 FL (ref 9.4–12.4)
POTASSIUM SERPL-SCNC: 4.8 MMOL/L (ref 3.5–5.1)
PROT SERPL-MCNC: 7.6 GM/DL (ref 5.8–7.6)
PROT UR QL STRIP: NEGATIVE
RBC # BLD AUTO: 4.33 X10(6)/MCL (ref 4.2–5.4)
RBC #/AREA URNS HPF: ABNORMAL /[HPF]
SODIUM SERPL-SCNC: 142 MMOL/L (ref 132–146)
SP GR UR STRIP: 1.02 (ref 1–1.03)
SQUAMOUS EPITHELIAL, UA: 7 /HPF (ref 0–4)
TSH SERPL-ACNC: 0.82 UIU/ML (ref 0.35–4.94)
UROBILINOGEN UR STRIP-ACNC: 0.2
VIT B12 SERPL-MCNC: 605 PG/ML (ref 213–816)
WBC # SPEC AUTO: 6.9 X10(3)/MCL (ref 4.5–11.5)
WBC #/AREA URNS AUTO: 15 /HPF (ref 0–3)
WBC #/AREA URNS HPF: 15 /HPF (ref 0–3)

## 2022-03-31 ENCOUNTER — HISTORICAL (OUTPATIENT)
Dept: LAB | Facility: HOSPITAL | Age: 87
End: 2022-03-31

## 2022-03-31 LAB
ABS NEUT (OLG): 3.12 (ref 2.1–9.2)
ALBUMIN SERPL-MCNC: 3.8 G/DL (ref 3.4–4.8)
ALBUMIN/GLOB SERPL: 1 {RATIO} (ref 1.1–2)
ALP SERPL-CCNC: 57 U/L (ref 40–150)
ALT SERPL-CCNC: 20 U/L (ref 0–55)
AST SERPL-CCNC: 22 U/L (ref 5–34)
BILIRUB SERPL-MCNC: 0.8 MG/DL (ref 0.2–1.2)
BILIRUBIN DIRECT+TOT PNL SERPL-MCNC: 0.3 (ref 0–0.5)
BILIRUBIN DIRECT+TOT PNL SERPL-MCNC: 0.5 (ref 0–0.8)
BUN SERPL-MCNC: 11.2 MG/DL (ref 9.8–20.1)
CALCIUM SERPL-MCNC: 9.6 MG/DL (ref 8.4–10.2)
CHLORIDE SERPL-SCNC: 105 MMOL/L (ref 98–111)
CHOLEST SERPL-MCNC: 164 MG/DL
CHOLEST/HDLC SERPL: 5 {RATIO} (ref 0–5)
CO2 SERPL-SCNC: 26 MMOL/L (ref 23–31)
CREAT SERPL-MCNC: 0.84 MG/DL (ref 0.57–1.11)
EOSINOPHIL NFR BLD MANUAL: 3 % (ref 0–8)
ERYTHROCYTE [DISTWIDTH] IN BLOOD BY AUTOMATED COUNT: 13.3 % (ref 11.5–17)
GLOBULIN SER-MCNC: 4 G/DL (ref 2.4–3.5)
GLUCOSE SERPL-MCNC: 100 MG/DL (ref 75–121)
GRANULOCYTES NFR BLD MANUAL: 62 % (ref 47–80)
HCT VFR BLD AUTO: 45.9 % (ref 37–47)
HDLC SERPL-MCNC: 34 MG/DL (ref 40–60)
HEMOLYSIS INTERF INDEX SERPL-ACNC: 7
HGB BLD-MCNC: 15 G/DL (ref 12–16)
ICTERIC INTERF INDEX SERPL-ACNC: 1
LDLC SERPL CALC-MCNC: 90 MG/DL (ref 50–140)
LIPEMIC INTERF INDEX SERPL-ACNC: 1
LYMPHOCYTES NFR BLD MANUAL: 27 % (ref 13–40)
MANUAL DIFF? (OHS): YES
MCH RBC QN AUTO: 31.5 PG (ref 27–31)
MCHC RBC AUTO-ENTMCNC: 32.7 G/DL (ref 33–36)
MCV RBC AUTO: 96.4 FL (ref 80–94)
MONOCYTES NFR BLD MANUAL: 8 % (ref 2–11)
PLATELET # BLD AUTO: 325 10*3/UL (ref 130–400)
PLATELET # BLD EST: ADEQUATE 10*3/UL
PMV BLD AUTO: 10.1 FL (ref 7.4–10.4)
POTASSIUM SERPL-SCNC: 4.6 MMOL/L (ref 3.5–5.1)
PROT SERPL-MCNC: 7.8 G/DL (ref 5.8–7.6)
RBC # BLD AUTO: 4.76 10*6/UL (ref 4.2–5.4)
RBC MORPH BLD: NORMAL
SODIUM SERPL-SCNC: 142 MMOL/L (ref 132–146)
TRIGL SERPL-MCNC: 199 MG/DL (ref 0–150)
VLDLC SERPL CALC-MCNC: 40 MG/DL
WBC # SPEC AUTO: 6 10*3/UL (ref 4.5–11.5)

## 2022-04-10 ENCOUNTER — HISTORICAL (OUTPATIENT)
Dept: ADMINISTRATIVE | Facility: HOSPITAL | Age: 87
End: 2022-04-10
Payer: MEDICARE

## 2022-04-26 VITALS
DIASTOLIC BLOOD PRESSURE: 70 MMHG | OXYGEN SATURATION: 96 % | SYSTOLIC BLOOD PRESSURE: 128 MMHG | WEIGHT: 192 LBS | BODY MASS INDEX: 35.33 KG/M2 | HEIGHT: 62 IN

## 2022-05-31 ENCOUNTER — OFFICE VISIT (OUTPATIENT)
Dept: INTERNAL MEDICINE | Facility: CLINIC | Age: 87
End: 2022-05-31
Payer: MEDICARE

## 2022-05-31 VITALS
OXYGEN SATURATION: 97 % | SYSTOLIC BLOOD PRESSURE: 114 MMHG | RESPIRATION RATE: 16 BRPM | DIASTOLIC BLOOD PRESSURE: 66 MMHG | TEMPERATURE: 98 F | HEIGHT: 61 IN | BODY MASS INDEX: 34.36 KG/M2 | HEART RATE: 73 BPM | WEIGHT: 182 LBS

## 2022-05-31 DIAGNOSIS — E78.2 MIXED HYPERLIPIDEMIA: ICD-10-CM

## 2022-05-31 DIAGNOSIS — K21.9 GASTROESOPHAGEAL REFLUX DISEASE, UNSPECIFIED WHETHER ESOPHAGITIS PRESENT: ICD-10-CM

## 2022-05-31 DIAGNOSIS — R41.89 IMPAIRED COGNITION: Primary | ICD-10-CM

## 2022-05-31 DIAGNOSIS — M48.061 DEGENERATIVE LUMBAR SPINAL STENOSIS: ICD-10-CM

## 2022-05-31 PROBLEM — M19.90 OSTEOARTHRITIS: Status: ACTIVE | Noted: 2022-05-31

## 2022-05-31 PROBLEM — E66.9 OBESITY: Status: ACTIVE | Noted: 2022-05-31

## 2022-05-31 PROBLEM — I10 PRIMARY HYPERTENSION: Status: ACTIVE | Noted: 2022-05-31

## 2022-05-31 PROBLEM — I65.29 STENOSIS OF CAROTID ARTERY: Status: ACTIVE | Noted: 2022-05-31

## 2022-05-31 PROCEDURE — 99213 OFFICE O/P EST LOW 20 MIN: CPT | Mod: ,,, | Performed by: INTERNAL MEDICINE

## 2022-05-31 PROCEDURE — 99213 PR OFFICE/OUTPT VISIT, EST, LEVL III, 20-29 MIN: ICD-10-PCS | Mod: ,,, | Performed by: INTERNAL MEDICINE

## 2022-05-31 RX ORDER — ATORVASTATIN CALCIUM 10 MG/1
10 TABLET, FILM COATED ORAL DAILY
Qty: 30 TABLET | Refills: 3 | Status: SHIPPED | OUTPATIENT
Start: 2022-05-31

## 2022-05-31 RX ORDER — ATORVASTATIN CALCIUM 10 MG/1
TABLET, FILM COATED ORAL
COMMUNITY
Start: 2021-08-19 | End: 2022-05-31

## 2022-05-31 RX ORDER — METOPROLOL TARTRATE 25 MG/1
25 TABLET, FILM COATED ORAL 2 TIMES DAILY
Qty: 60 TABLET | Refills: 11 | Status: SHIPPED | OUTPATIENT
Start: 2022-05-31

## 2022-08-22 DIAGNOSIS — E78.2 MIXED HYPERLIPIDEMIA: Primary | ICD-10-CM

## 2022-08-22 DIAGNOSIS — M48.061 DEGENERATIVE LUMBAR SPINAL STENOSIS: ICD-10-CM

## 2022-08-22 DIAGNOSIS — K21.9 GASTROESOPHAGEAL REFLUX DISEASE, UNSPECIFIED WHETHER ESOPHAGITIS PRESENT: ICD-10-CM

## 2022-08-22 DIAGNOSIS — E66.9 OBESITY, UNSPECIFIED CLASSIFICATION, UNSPECIFIED OBESITY TYPE, UNSPECIFIED WHETHER SERIOUS COMORBIDITY PRESENT: ICD-10-CM

## 2022-08-22 DIAGNOSIS — I65.29 STENOSIS OF CAROTID ARTERY, UNSPECIFIED LATERALITY: ICD-10-CM

## 2022-09-07 ENCOUNTER — TELEPHONE (OUTPATIENT)
Dept: INTERNAL MEDICINE | Facility: CLINIC | Age: 87
End: 2022-09-07
Payer: MEDICARE

## 2022-09-07 DIAGNOSIS — Z75.1: Primary | ICD-10-CM

## 2022-09-09 ENCOUNTER — HOSPITAL ENCOUNTER (OUTPATIENT)
Dept: RADIOLOGY | Facility: HOSPITAL | Age: 87
Discharge: HOME | End: 2022-09-09
Attending: NURSE PRACTITIONER
Payer: MEDICARE

## 2022-09-09 DIAGNOSIS — Z75.1: ICD-10-CM

## 2022-09-09 PROCEDURE — 71046 X-RAY EXAM CHEST 2 VIEWS: CPT | Mod: TC

## 2022-09-19 ENCOUNTER — HISTORICAL (OUTPATIENT)
Dept: ADMINISTRATIVE | Facility: HOSPITAL | Age: 87
End: 2022-09-19
Payer: MEDICARE

## 2022-09-20 ENCOUNTER — TELEPHONE (OUTPATIENT)
Dept: INTERNAL MEDICINE | Facility: CLINIC | Age: 87
End: 2022-09-20
Payer: MEDICARE

## 2022-09-27 NOTE — PROGRESS NOTES
Patient:   Sylvia Segovia            MRN: [id number]            FIN: [id number]               Age:   85 years     Sex:  Female     :  10/23/1931   Associated Diagnoses:   None   Author:   Abdoul Lopez MD      Chief Complaint       2017 14:39 CDT       1 wk hosp.  f/u [facility]    2017 9:16 CDT        RIGHT THR SX 7/10/17 GLOBAL 10/8/17 X-RAYS TODAY.  SHE STATED SHE DID FALL  ON HER LEFT SIDE.17,  (Modified)       History of Present Illness   The patient is an 85-year-old woman seen in hospital follow-up.  I saw her a few weeks ago following her hip replacement.  This was on the right side.  She was in the hospital a few days and transferred to rehab.  There she was followed by other doctors.  It was some concern about a superficial wound infection and she was treated with Levaquin.  Serratia grew out of the wound culture.    She done reasonably well at home.  There she has home health and sitters.  She does suffer a fall onto her left chest area 3 or 4 days ago.  Saw orthopedic doctor today.  X-rays of the hip look okay.  Staples have been removed.  She still has Steri-Strips.  No drainage of the wound.  No fever or chills.  She does suffer from significant fatigue.      Health Status   Allergies:    Allergic Reactions (Selected)  No Known Allergies,    Allergies (1) Active Reaction  No Known Allergies None Documented     Current medications:  (Selected)   Outpatient Medications  Pending Complete  midazolam: 2 mg, form: Injection, IV Push, q5min, Order duration: 2 dose(s), first dose 17 7:29:00 CDT, stop date 17 7:38:00 CDT, (up to 5 mg for moderate anxiety), 30,025  Prescriptions  Prescribed  CeleBREX 200 mg oral capsule: 200 mg = 1 cap(s), Oral, Daily, # 30 cap(s), 0 Refill(s), Pharmacy: "[facility] [place], [place] [address]  Ecotrin 325 mg oral enteric coated tablet: 325 mg = 1 tab(s), Oral, Daily, Start 17  Stop 17, # 42 tab(s), 0 Refill(s), Pharmacy: "[facility] [place] [address]  [facility]  Levaquin 500 mg oral tablet: 500 mg = 1 tab(s), Oral, Daily, X 10 day(s), # 10 tab(s), 0 Refill(s), Pharmacy: [address]  Lipitor 10 mg oral tablet: 10 mg, Oral, Daily, # 30 tab(s), 11 Refill(s), Pharmacy: [address]  Percocet 5/325 oral tablet: 1 tab(s), Oral, q6hr, PRN PRN pain, # 30 tab(s), 0 Refill(s), Pharmacy: [address]  Protonix 40 mg ORAL enteric coated tablet: 40 mg = 1 tab(s), Oral, Daily, # 30 tab(s), 0 Refill(s), Pharmacy: [address]  metoprolol tartrate 25 mg oral tablet: 25 mg = 1 tab(s), Oral, BID, # 60 tab(s), 0 Refill(s), Pharmacy: [address]  traMADol 50 mg oral tablet: 50 mg = 1 tab(s), Oral, q6hr, PRN PRN for pain, # 30 tab(s), 0 Refill(s)  traMADol 50 mg oral tablet: 50 mg = 1 tab(s), Oral, q6hr, PRN PRN pain, mild, # 30 tab(s), 0 Refill(s), other reason (Rx)  traZODONE 50 mg oral tablet ( Desyrel ): 50 mg = 1 tab(s), Oral, Once a day (at bedtime), PRN PRN insomnia, # 30 tab(s), 0 Refill(s), Pharmacy: [address]  Documented Medications  Documented  Multiple Vitamins with Minerals oral capsule: Oral, Daily, 0 Refill(s)  Vitamin C 500 mg oral tablet: 500 mg = 1 tab(s), Oral, Daily, 0 Refill(s),    Home Medications (12) Active  CeleBREX 200 mg oral capsule 200 mg = 1 cap(s), Oral, Daily  Ecotrin 325 mg oral enteric coated tablet 325 mg = 1 tab(s), Oral, Daily  Levaquin 500 mg oral tablet 500 mg = 1 tab(s), Oral, Daily  Lipitor 10 mg oral tablet 10 mg, Oral, Daily  metoprolol tartrate 25 mg oral tablet 25 mg = 1 tab(s), Oral, BID  Multiple Vitamins with Minerals oral capsule , Oral, Daily  Percocet 5/325 oral tablet 1 tab(s), PRN, Oral, q6hr  Protonix 40 mg ORAL enteric coated tablet 40 mg = 1 tab(s), Oral, Daily  traMADol 50 mg oral tablet 50 mg = 1 tab(s), PRN, Oral, q6hr  traMADol 50 mg oral tablet 50 mg = 1 tab(s), PRN, Oral, q6hr  traZODONE 50 mg oral tablet ( Desyrel ) 50 mg = 1 tab(s), PRN,  Oral, Once a day (at bedtime)  Vitamin C 500 mg oral tablet 500 mg = 1 tab(s), Oral, Daily     Problem list:    All Problems  ADL - activity of daily living / SNOMED CT 3525623458 / Confirmed / Improving  Carotid artery stenosis / ICD-9-.10 / Confirmed  Degenerative joint disease / ICD-9-.90 / Confirmed  Primary osteoarthritis of right hip / SNOMED CT 5053530006 / Confirmed  Degenerative lumbar spinal stenosis / SNOMED CT 299920568 / Confirmed  Hypertension, essential(  Confirmed  ) / SNOMED CT 46162061 / Confirmed  Gastroesophageal reflux disease(  Confirmed  ) / SNOMED CT 474614437 / Confirmed  S/P total hip arthroplasty / SNOMED CT 5041260552 / Confirmed  Hypoalbuminemia(  Confirmed  ) / SNOMED CT 385870931 / Confirmed  Impaired mobility / SNOMED CT 920350586 / Confirmed  Lumbosacral spinal stenosis / SNOMED CT 9682799674 / Confirmed  Malaise and fatigue / SNOMED CT 896050114 / Confirmed  Mixed hyperlipidemia / SNOMED CT 422067441 / Confirmed  Obesity(  Probable Diagnosis  ) / SNOMED CT 6876954981 / Confirmed  Ongoing use of possibly toxic medication / ICD-9-CM V58.69 / Confirmed  Postoperative wound infection / SNOMED CT 06792193 / Confirmed  Greater trochanteric bursitis of right hip / SNOMED CT 69014780 / Confirmed  Trochanteric bursitis, right hip / SNOMED CT 86367514 / Confirmed  Resolved: Able to lie down / SNOMED CT 900756342  Resolved: Activity intolerance / SNOMED CT 841676540  Resolved: Arthritis / SNOMED CT 9720175  Resolved: CAD - Coronary artery disease / SNOMED CT 3557978307  CABG x3  Resolved: cardiac arrythmias / SNOMED CT 3786025310  Resolved: Cataracts / SNOMED CT 2519664429  Resolved: Chronic sinus infection / SNOMED CT [id number]  Resolved: Constipation / SNOMED CT 37725463  Resolved: Discharge planning / SNOMED CT 2770887769  Resolved: Hearing aid / SNOMED CT 54810586  Resolved: Hearing loss / SNOMED CT 55699906  Resolved: Hyperlipidemia / SNOMED  CT 69550845  Resolved: MRSA infection / SNOMED CT 5392701688  greater than 2 years  Resolved: Osteoporosis / SNOMED CT 945871159  Resolved: Rectal prolapse / SNOMED CT [id number]  Resolved: Sleep apnea / SNOMED CT 277357391  Resolved: Thyroid disease / SNOMED CT 651782268  Resolved: Wears glasses / SNOMED CT 724631821  Canceled: Activity intolerance / SNOMED CT 739776496  Problem automatically updated based on documentation on Transferring Bed or Chair ADL Index, ADL Index Score, Respiratory Symptoms, Shortness of Breath Indicator, Activity Tolerance, Respiratory Symptoms, and/or Cardiovascular Symptoms.  Canceled: Activity intolerance / SNOMED CT 946327938  Canceled: ADL / SNOMED CT 326174373  Canceled: Alteration in patterns of urinary elimination / SNOMED CT 48879770  Problem automatically updated based on documentation on Genitourinary Symptoms and/or Urinary Elimination.  Canceled: Alteration in tissue perfusion / SNOMED CT 7293348764  Problem automatically updated based on documentation on Capillary Refills, Brachial Pulses, Radial Pulses, Femoral Pulses, Dorsalis Pulses, Ulnar pulses, Popliteal Pulses, Postibial Pulses, Edemas, Affect/Behavior, Orientation Assessment, Arterial Line Site.  Canceled: Altered mental status / SNOMED CT 7206076640  Problem automatically updated based on documentation on Orientation Assessment, Affect/Behavior.  Canceled: At risk for falls / SNOMED CT 596221321  Problem automatically updated based on documentation on History of Falls, History of Falls in last 3 months Shoemaker, Shoemaker Fall Risk Score and/or ADLs.  Canceled: At risk of pressure sore / SNOMED CT 623122524  Canceled: At risk of UTI / SNOMED CT 688409931  Problem automatically updated based on documentation on Urinary Elimination.  Canceled: Bowel dysfunction / SNOMED CT 273665151  Problem added automatically by system based on documentation of Bowel Sounds as Hyperactive, Hyperactive, or Absent;   GI Symptoms as Hyperactive, Constipation, or Diarrhea and/or Passing Flatus as No.  Canceled: Deficient knowledge / SNOMED CT 6337846563  Problem automatically updated based on documentation on Barriers to Learning, Level of Consciousness, Orientation Assessment, or Sensory Deficits.  Canceled: Depression / ICD-9-  Canceled: Disorder of fluid balance / SNOMED CT 549300723  Problem automatically updated based on documentation on Edema, Anasarca, Edema, Generalized, Edema, Bilateral Periorbital, Edema, Face, Edema,, Bilateral Arm, Edema, Left Arm, Edema, Right Arm, Edema, Bilateral Hand, Edema, Left Hand, Edema, Right Hand, Edema, Labia, Edema, Scrotum, Edema, Penile, Edema, Bilateral Upper Leg, Edema, Bilateral Lower Leg, Edema, Bilateral Pretibial, Edema, Bilateral Ankle, Edema, Bilateral Pedal, GI Symptoms, Skin Turgor General, and/or Gestational Age Method.  Canceled: Endurance / SNOMED CT 8046736066  Canceled: Impaired mobility / SNOMED CT 856524632  Problem automatically updated based on documentation on Assistive Device, ADLs, Activity Status ADLs, Musculoskeletal Range of Motion, Musculoskeletal Activity, Special Orthopedic Devices, and/or Traction Type.  Canceled: Ineffective airway clearance / SNOMED CT 880181055  Problem automatically updated based on documentation of Shortness of Breath on Respiratory Symptoms; SpO2 less than 92%; Unable to clear secretions or Weak on Cough.  Canceled: Mobility ability / SNOMED CT 7146121175  Canceled: On examination - decreased level of consciousness / SNOMED CT 3498638533  Problem automatically updated based on documentation on Level of Consciousness, Orientation Assessment, Lima Coma Score.  Canceled: Total self-care deficit / SNOMED CT 839179360  Problem automatically updated based on documentation on ADL Score Index, Feeding Assistance, and/or ADLs.      Histories   Past Medical History:    Resolved  Discharge planning (0985769468): Onset on 6/11/2012 at 80  years.  Resolved.  CAD - Coronary artery disease (3125274551):  Resolved.  Comments:  5/23/2012 CDT 11:03 CDT - Elida Lei RN  CABG x3  Hyperlipidemia (54243312):  Resolved.  Thyroid disease (257373708):  Resolved.  MRSA infection (0375139212):  Resolved.  Comments:  12/9/2015 CST 11:17 CST - Ghislaine ANGUIANO, Daija Ruff  greater than 2 years  cardiac arrythmias (1547225212):  Resolved.  Rectal prolapse ([id number]):  Resolved.  Constipation (36797602):  Resolved.  Wears glasses (513750774):  Resolved.  Cataracts (1620852244):  Resolved.  Chronic sinus infection ([id number]):  Resolved.  Able to lie down (085090669):  Resolved.  Hearing loss (30044465):  Resolved.  Hearing aid (77859997):  Resolved.  Sleep apnea (012400306):  Resolved.  Arthritis (7324986):  Resolved.  Osteoporosis (380663273):  Resolved.   Family History:    Stroke  Mother  Cancer  Sister     Procedure history:    Total Hip Arthroplasty (Right) on 7/10/2017 at 85 Years.  Comments:  7/10/2017 09:58 - Romario Montes RN  auto-populated from documented surgical case  20610 - ARTHROCENTESIS MAJOR JOINT (Left) on 4/17/2017 at 85 Years.  Comments:  4/17/2017 12:37 - Roopa Siddiqi RN  auto-populated from documented surgical case  20610 - ARTHROCENTESIS MAJOR JOINT (Bilateral) on 4/13/2017 at 85 Years.  Comments:  4/13/2017 09:51 - Rito Allred RN  auto-populated from documented surgical case  75711 - RT CATARACT W/IOL 1 STA PHACO (Right) on 12/9/2015 at 84 Years.  Comments:  12/9/2015 12:58 - Roopa Siddiqi RN  auto-populated from documented surgical case  CABG on 10/6/2010 at 78 Years.  Comments:  5/23/2012 11:08 - YANET Marrero RN ,  3 vessels  Cataract (852485728) in 2006 at 75 Years.  Dislocated nasal septum (793770213).  Rotator cuff (13423102).  Comments:  12/7/2015 16:34 - Camron ANGUIANO, Aislinn BONILLA.  bilateral  Bilateral replacement of knee joints (6548116756).   Social  History        Social & Psychosocial Habits    Alcohol  06/06/2012 Risk Assessment: Denies Alcohol Use    04/29/2015  Use: Never    Employment/School  04/29/2015  Status: Retired    Home/Environment  07/13/2017  Lives with: [employer]    Nutrition/Health  04/29/2015  Type of diet: Regular    Sexual  07/28/2016  Sexually active: No    Substance Abuse  06/06/2012 Risk Assessment: Denies Substance Abuse    04/29/2015  Use: Never    Tobacco  06/06/2012 Risk Assessment: Denies Tobacco Use    08/01/2017  Use: Never smoker  .        Physical Examination   Vital Signs   8/1/2017 14:39 CDT       Peripheral Pulse Rate     84 bpm                             Respiratory Rate          18 br/min                             SpO2                      96 %                             Systolic Blood Pressure   106 mmHg                             Diastolic Blood Pressure  54 mmHg  LOW    8/1/2017 9:16 CDT        Systolic Blood Pressure   153 mmHg  HI                             Diastolic Blood Pressure  76 mmHg     Measurements from flowsheet : Measurements   8/1/2017 14:39 CDT       Weight Dosing             0 kg                             Weight Measured           0 kg                             Height/Length Dosing      157.48 cm                             Height/Length Measured    157.48 cm                             BSA Measured              0 m2                             Body Mass Index Measured  0 kg/m2    8/1/2017 9:16 CDT        Weight Dosing             86.1 kg                             Weight Measured           86.1 kg                             Weight Measured and Calculated in Lbs     189.82 lb                             Height/Length Dosing      157.48 cm                             Height/Length Measured    157.48 cm                             BSA Measured              1.94 m2                             Body Mass Index Measured  34.72 kg/m2     Vital signs are stable.    General appearance  is unremarkable. Patient is in no distress    HEENT exam unremarkable    Neck supple without thyromegaly or adenopathy.    Heart has a regular rate and rhythm without murmurs gallops or rubs    Lungs clear    Abdomen is nontender and without mass or hepatosplenomegaly.    Extremities without clubbing cyanosis but there is trace left and 1+ right pretibial edema.  No calf tenderness.  The incision looks clean no drainage.  No redness.  There is a fairly large bruise of the left postero-lateral inferior rib cage      Impression and Plan   1.  Weakness and debilitation and fatigue following total hip replacement right.  Likely multifactorial.    2.  Hypertension.  Stable    3.  GERD.  Stable    4.  Diffuse degenerative joint disease    5.  Hyperlipidemia    The plan is to have home health check a CBC CMP TSH sed rate and urinalysis tomorrow.  Follow-up with me one month if all goes well and assuming pending blood tests and urine test are okay.

## 2022-09-28 ENCOUNTER — OFFICE VISIT (OUTPATIENT)
Dept: INTERNAL MEDICINE | Facility: CLINIC | Age: 87
End: 2022-09-28
Payer: MEDICARE

## 2022-09-28 VITALS
SYSTOLIC BLOOD PRESSURE: 126 MMHG | BODY MASS INDEX: 32.66 KG/M2 | HEART RATE: 80 BPM | DIASTOLIC BLOOD PRESSURE: 76 MMHG | OXYGEN SATURATION: 96 % | WEIGHT: 173 LBS | HEIGHT: 61 IN

## 2022-09-28 DIAGNOSIS — R41.89 IMPAIRED COGNITION: ICD-10-CM

## 2022-09-28 DIAGNOSIS — E78.2 MIXED HYPERLIPIDEMIA: Primary | ICD-10-CM

## 2022-09-28 DIAGNOSIS — K21.9 GASTROESOPHAGEAL REFLUX DISEASE, UNSPECIFIED WHETHER ESOPHAGITIS PRESENT: ICD-10-CM

## 2022-09-28 DIAGNOSIS — I10 PRIMARY HYPERTENSION: ICD-10-CM

## 2022-09-28 PROCEDURE — 99214 OFFICE O/P EST MOD 30 MIN: CPT | Mod: ,,, | Performed by: INTERNAL MEDICINE

## 2022-09-28 PROCEDURE — G0008 ADMIN INFLUENZA VIRUS VAC: HCPCS | Mod: ,,, | Performed by: INTERNAL MEDICINE

## 2022-09-28 PROCEDURE — G0008 FLU VACCINE - QUADRIVALENT - ADJUVANTED: ICD-10-PCS | Mod: ,,, | Performed by: INTERNAL MEDICINE

## 2022-09-28 PROCEDURE — 90694 VACC AIIV4 NO PRSRV 0.5ML IM: CPT | Mod: ,,, | Performed by: INTERNAL MEDICINE

## 2022-09-28 PROCEDURE — 90694 FLU VACCINE - QUADRIVALENT - ADJUVANTED: ICD-10-PCS | Mod: ,,, | Performed by: INTERNAL MEDICINE

## 2022-09-28 PROCEDURE — 99214 PR OFFICE/OUTPT VISIT, EST, LEVL IV, 30-39 MIN: ICD-10-PCS | Mod: ,,, | Performed by: INTERNAL MEDICINE

## 2022-09-28 NOTE — PROGRESS NOTES
"Subjective:       Patient ID: Sylvia Segovia is a 89 y.o. female.    Chief Complaint: Evaluation for Assisted Living (Pt here for Assisted evaluation and pt also fell and hit her head without treatment about 4 days ago)      The patient is a 89-year-old woman in for follow-up of multiple medical problems and also for paperwork to get her admitted to an assisted living facility in [place] where her daughter lives.    She was living here at home with a more distant relative.  It was felt she needed to be closer to the daughters and in a better living environment.      He does have issues with worsening cognitive function as well as episodic falling, and a general failure to thrive.      She had been on medicines for cholesterol and blood pressure but some L these medicines have been discontinued.  She has should also be on low-dose aspirin but I do not think she was taking that either.    Review of Systems     Current Outpatient Medications on File Prior to Visit   Medication Sig Dispense Refill    atorvastatin (LIPITOR) 10 MG tablet Take 1 tablet (10 mg total) by mouth once daily. (Patient not taking: Reported on 9/28/2022) 30 tablet 3    metoprolol tartrate (LOPRESSOR) 25 MG tablet Take 1 tablet (25 mg total) by mouth 2 (two) times a day. (Patient not taking: Reported on 9/28/2022) 60 tablet 11    UNABLE TO FIND Take by mouth.       No current facility-administered medications on file prior to visit.     Objective:      /76 (BP Location: Left arm, Patient Position: Sitting, BP Method: Medium (Manual))   Pulse 80   Ht 5' 1" (1.549 m)   Wt 78.5 kg (173 lb)   SpO2 96%   BMI 32.69 kg/m²     Physical Exam  Vitals reviewed.   Constitutional:       Appearance: Normal appearance.      Comments: The patient is very hard of hearing and apparently does not have her hearing aids.  She is very frail.   HENT:      Head: Normocephalic.      Comments: There is an abrasion or scab on the top of her head where her " hair parts.  Reportedly there was a fall about 4 days ago with subsequent minor injury.  She did not seek medical attention.     Nose: Nose normal.      Mouth/Throat:      Pharynx: Oropharynx is clear.   Eyes:      Pupils: Pupils are equal, round, and reactive to light.   Neck:      Thyroid: No thyromegaly.   Cardiovascular:      Rate and Rhythm: Normal rate and regular rhythm.      Pulses: Normal pulses.   Abdominal:      General: Abdomen is flat. Bowel sounds are normal.      Palpations: Abdomen is soft. There is no hepatomegaly, splenomegaly or mass.      Tenderness: There is no abdominal tenderness.   Musculoskeletal:      Cervical back: Neck supple.      Comments: There is trace bilateral pretibial edema   Lymphadenopathy:      Cervical: No cervical adenopathy.   Skin:     General: Skin is warm and dry.   Neurological:      Mental Status: She is alert.     Recent chest x-ray reviewed.  It is clear.  Assessment:       1. Mixed hyperlipidemia    2. Impaired cognition    3. Gastroesophageal reflux disease, unspecified whether esophagitis present    4. Primary hypertension        1. Hyperlipidemia.  No longer on statin therapy.  Given her advanced age and general poor prognosis I think that is reasonable    2. History of hypertension.  Normotensive off meds    3. Remote history coronary disease.  Status post bypass surgery.  Asymptomatic    4. History of GERD.  No longer an issue    5. Cognitive decline.  Age related.    6. Abrasion on scalp    Plan:       Agree with plans to transfer her to an assisted living environment with the immediate family nearby.  I think it is okay to stay off the Toprol and the Lipitor for now.    She will get a flu shot today.    Recommend she resume low-dose aspirin i.e. 81 mg daily.      Follow-up with physician of her choice when she gets up to [place].  Follow-up with me p.r.n..  I understand she is leaving town just a few days.

## 2022-09-29 ENCOUNTER — TELEPHONE (OUTPATIENT)
Dept: INTERNAL MEDICINE | Facility: CLINIC | Age: 87
End: 2022-09-29
Payer: MEDICARE

## 2022-09-29 NOTE — TELEPHONE ENCOUNTER
Advised Jovana that paperwork was faxed and confirmation received on yesterday. She will check into it and call me back.

## 2023-01-03 ENCOUNTER — TELEPHONE (OUTPATIENT)
Dept: INTERNAL MEDICINE | Facility: CLINIC | Age: 88
End: 2023-01-03
Payer: MEDICARE

## 2023-01-03 NOTE — TELEPHONE ENCOUNTER
----- Message from Luz Ronquillo LPN sent at 1/3/2023  1:05 PM CST -----  Regarding: lizette PONCE 1/09 @2:20  Fasting labs needed.

## 2023-02-04 NOTE — PROGRESS NOTES
"Subjective:       Patient ID: Sylvia Segovia is a 89 y.o. female.    Chief Complaint: Follow-up      The patient is a 89-year-old woman in for follow-up of multiple problems.  She has no new complaints.  She denies any additional falls.  She is brought in by her grandson who is not real clear on her history either.  Her medications were reviewed.    She is also hard of hearing.    Follow-up      Review of Systems     Current Outpatient Medications on File Prior to Visit   Medication Sig Dispense Refill    [DISCONTINUED] atorvastatin (LIPITOR) 10 MG tablet   See Instructions, TAKE 1 TABLET BY MOUTH DAILY, # 360 tab(s), 0 Refill(s), Pharmacy: [id number], 157, cm, Height/Length Dosing, 06/23/21 13:16:00 CDT, 88.45, kg, Weight Dosing, 06/23/21 13:16:00 CDT      [DISCONTINUED] metoprolol tartrate (LOPRESSOR) 25 MG tablet Take 1 tablet by mouth 2 (two) times a day.      [DISCONTINUED] atorvastatin (LIPITOR) 10 MG tablet Take 10 mg by mouth once daily.       No current facility-administered medications on file prior to visit.     Objective:      /66 (BP Location: Right arm, Patient Position: Sitting, BP Method: Large (Manual))   Pulse 73   Temp 97.5 °F (36.4 °C) (Temporal)   Resp 16   Ht 5' 1" (1.549 m)   Wt 82.6 kg (182 lb)   SpO2 97%   BMI 34.39 kg/m²     Physical Exam  Vitals reviewed.   Constitutional:       Appearance: Normal appearance.   HENT:      Head: Normocephalic.      Nose: Nose normal.      Mouth/Throat:      Pharynx: Oropharynx is clear.   Eyes:      Pupils: Pupils are equal, round, and reactive to light.   Neck:      Thyroid: No thyromegaly.   Cardiovascular:      Rate and Rhythm: Normal rate and regular rhythm.      Pulses: Normal pulses.   Abdominal:      General: Abdomen is flat. Bowel sounds are normal.      Palpations: Abdomen is soft. There is no hepatomegaly, splenomegaly or mass.      Tenderness: There is no abdominal tenderness.   Musculoskeletal:      Cervical " back: Neck supple.   Lymphadenopathy:      Cervical: No cervical adenopathy.   Skin:     General: Skin is warm and dry.   Neurological:      Mental Status: She is alert.       lab work from 2 months ago reviewed and it was acceptable.    Assessment:       1. Impaired cognition    2. Mixed hyperlipidemia    3. Gastroesophageal reflux disease, unspecified whether esophagitis present    4. Degenerative lumbar spinal stenosis        Plan:       Her general condition is declining.  Her age is the main contributor.  Her medical problems appear to be quite stable.    Will continue current treatment.  Medication refills were sent to her pharmacy.  Full pattern seeing her back here in 3 months with CBC CMP lipid TSH prior           5